# Patient Record
Sex: FEMALE | Race: WHITE | Employment: FULL TIME | URBAN - METROPOLITAN AREA
[De-identification: names, ages, dates, MRNs, and addresses within clinical notes are randomized per-mention and may not be internally consistent; named-entity substitution may affect disease eponyms.]

---

## 2024-04-12 LAB
C. TRACHOMATIS, EXTERNAL RESULT: NEGATIVE
N. GONORRHOEAE, EXTERNAL RESULT: NEGATIVE

## 2024-04-26 LAB
ABO, EXTERNAL RESULT: NORMAL
HEP B, EXTERNAL RESULT: NEGATIVE
HEPATITIS C ANTIBODY, EXTERNAL RESULT: NEGATIVE
HIV, EXTERNAL RESULT: NEGATIVE
RH FACTOR, EXTERNAL RESULT: POSITIVE
RPR, EXTERNAL RESULT: NON REACTIVE
RUBELLA TITER, EXTERNAL RESULT: NORMAL

## 2024-07-11 NOTE — PROGRESS NOTES
CARDIOLOGY CONSULTATION        Patient Name: Stephanie E Runner  Primary Care physician: Rosetta Alcantar MD    Reason for Referral/Chief Complaint: Stephanie E Runner is a 34 y.o. patient who is referred to cardiology clinic today for evaluation and treatment of palpitations.     History of Present Illness:   Stephanie E Runner is a very pleasant 34 y.o. female with PMHX  with anemia, depression treated with SSRI, childhood murmur. She presented to ED with palpitations, at that time she reported history of bradycardia during her previous pregnancy. EKG NSR 83bpm. She previously saw Dr. Pierre, 2021.   Prior cardiac testing include echo 3/2021 EF of 55%, mild MR, mild to moderate TR. she presented to the ED in June for chest pain, shortness of breath, tachycardia- workup unremarkable.    Today, she is experiencing intermittent elevated heart rate Reports her normal heart rate is in the 60's. Currently, she is pregnant with her third child and having heart rates in the 120-140's. She is ~22 weeks pregnant. The elevated heart rate happens every couple days, not every day. She will have symptoms of chest pain in the middle of chest, she could feel herself \"vibrating\", heart racing and had lightheadedness. Describes the chest pain as shooting pain in the middle, this incident happened right after eating. Lasted for 30 mins. She reports when she lays flat she does experiencing shortness of breath.     The patient denies shortness of breath at rest and dyspnea with exertion. Denies near-syncope or alexander syncope. Denies paroxysmal nocturnal dyspnea, orthopnea, bendopnea, increasing lower extremity edema or weight gain.      Denies tobacco use. Denies alcohol use. She does have her medical card for anxiety.   Past Medical History:   has a past medical history of Anemia, Depression, and Genital herpes.    Surgical History:   has no past surgical history on file.     Social History:   reports that she has never smoked.

## 2024-07-12 ENCOUNTER — TELEPHONE (OUTPATIENT)
Dept: CARDIOLOGY CLINIC | Age: 34
End: 2024-07-12

## 2024-07-12 ENCOUNTER — OFFICE VISIT (OUTPATIENT)
Dept: CARDIOLOGY CLINIC | Age: 34
End: 2024-07-12
Payer: COMMERCIAL

## 2024-07-12 VITALS
WEIGHT: 138.5 LBS | DIASTOLIC BLOOD PRESSURE: 58 MMHG | SYSTOLIC BLOOD PRESSURE: 92 MMHG | BODY MASS INDEX: 23.64 KG/M2 | HEIGHT: 64 IN | HEART RATE: 89 BPM | OXYGEN SATURATION: 99 %

## 2024-07-12 DIAGNOSIS — R06.02 SHORTNESS OF BREATH: ICD-10-CM

## 2024-07-12 DIAGNOSIS — R07.9 CHEST PAIN, UNSPECIFIED TYPE: ICD-10-CM

## 2024-07-12 DIAGNOSIS — Z79.899 MEDICATION MANAGEMENT: ICD-10-CM

## 2024-07-12 DIAGNOSIS — R42 DIZZINESS: Primary | ICD-10-CM

## 2024-07-12 PROCEDURE — 99204 OFFICE O/P NEW MOD 45 MIN: CPT | Performed by: STUDENT IN AN ORGANIZED HEALTH CARE EDUCATION/TRAINING PROGRAM

## 2024-07-12 PROCEDURE — 93270 REMOTE 30 DAY ECG REV/REPORT: CPT | Performed by: INTERNAL MEDICINE

## 2024-07-12 NOTE — PATIENT INSTRUCTIONS
Increase your water intake, try to take 90oz of water in a day.   Cardiac event monitor   Obtain echo   Please obtain the following labs;  TSH    Your provider has ordered testing for further evaluation.  An order/prescription has been included in your paper work.   To schedule outpatient testing, contact Central Scheduling by calling YOLANDE ([phone]).

## 2024-07-12 NOTE — TELEPHONE ENCOUNTER
Monitor placed by SCOTTIE Douglass  Monitor company BS  Length of monitor 7 days  Monitor ordered by AMP  Monitor ID [id number]  Activation successful prior to pt leaving office? Yes

## 2024-07-18 DIAGNOSIS — Z79.899 MEDICATION MANAGEMENT: ICD-10-CM

## 2024-07-19 LAB — TSH SERPL DL<=0.005 MIU/L-ACNC: 1.75 UIU/ML (ref 0.27–4.2)

## 2024-07-30 ENCOUNTER — ANCILLARY PROCEDURE (OUTPATIENT)
Dept: CARDIOLOGY CLINIC | Age: 34
End: 2024-07-30
Payer: COMMERCIAL

## 2024-07-30 DIAGNOSIS — R42 DIZZINESS: ICD-10-CM

## 2024-07-31 PROCEDURE — 93272 ECG/REVIEW INTERPRET ONLY: CPT | Performed by: INTERNAL MEDICINE

## 2024-08-23 ENCOUNTER — TELEPHONE (OUTPATIENT)
Dept: CARDIOLOGY CLINIC | Age: 34
End: 2024-08-23

## 2024-08-23 NOTE — TELEPHONE ENCOUNTER
Pt has been approved for Echo (TTE) complete (PRN contrast/bubble/strain/3D) [facility] From 8/22[facility]24  Approval # [id number]

## 2024-10-10 NOTE — PROGRESS NOTES
CARDIOLOGY OFFICE VISIT         Patient Name: Laura Gandara  Primary Care physician: Rosetta Alcantar MD    Reason for Referral/Chief Complaint: Laura Gandara is a 34 y.o. patient who presents today for a cardiology follow up.     History of Present Illness:   Laura Gandara is a very pleasant 34 y.o. female with PMHX  with anemia, depression treated with SSRI, childhood murmur. She presented to ED with palpitations, at that time she reported history of bradycardia during her previous pregnancy. EKG NSR 83bpm. She previously saw Dr. Pierre, 2021. Prior cardiac testing include echo 3/2021 EF of 55%, mild MR, mild to moderate TR. she presented to the ED 6/20/24 for chest pain, shortness of breath, tachycardia- workup unremarkable.  OV 7/12/24 she reported she is experiencing intermittent elevated heart rate, reported her normal heart rate is in the 60's. Currently, she is pregnant with her third child and having heart rates in the 120-140's. She is ~22 weeks pregnant. The elevated heart rate happens every couple days, not every day. She will have symptoms of chest pain in the middle of chest, she could feel herself \"vibrating\", heart racing and had lightheadedness. Described the chest pain as shooting pain in the middle, this incident happened right after eating. Lasted for 30 mins. She reports when she lays flat she does experiencing shortness of breath.    In the interim, echo 10/1/24 EF 55-60%.  Trace MR/TR.  Very trivial pericardial effusion.    Today, she is due 11/17. She report shortness of breath with laying down. She reports some swelling in her ankles, she just purchased compression stockings. 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  Patient states since last visit with lifestyle modifications including drinking more water she has had less palpitations/lightheadedness.  The patient denies chest pain, shortness of breath at rest or  with exertion. Denies near-syncope or alexander syncope.

## 2024-10-18 ENCOUNTER — OFFICE VISIT (OUTPATIENT)
Dept: CARDIOLOGY CLINIC | Age: 34
End: 2024-10-18
Payer: COMMERCIAL

## 2024-10-18 VITALS
BODY MASS INDEX: 26.49 KG/M2 | HEIGHT: 65 IN | OXYGEN SATURATION: 97 % | DIASTOLIC BLOOD PRESSURE: 56 MMHG | SYSTOLIC BLOOD PRESSURE: 112 MMHG | WEIGHT: 159 LBS | HEART RATE: 93 BPM

## 2024-10-18 DIAGNOSIS — R42 LIGHTHEADEDNESS: ICD-10-CM

## 2024-10-18 DIAGNOSIS — Z3A.36 36 WEEKS GESTATION OF PREGNANCY: ICD-10-CM

## 2024-10-18 DIAGNOSIS — R00.2 PALPITATIONS: Primary | ICD-10-CM

## 2024-10-18 PROBLEM — I50.9 HEART FAILURE (HCC): Status: RESOLVED | Noted: 2021-03-10 | Resolved: 2024-10-18

## 2024-10-18 PROCEDURE — 99212 OFFICE O/P EST SF 10 MIN: CPT | Performed by: STUDENT IN AN ORGANIZED HEALTH CARE EDUCATION/TRAINING PROGRAM

## 2024-10-22 LAB — GBS, EXTERNAL RESULT: NEGATIVE

## 2024-10-29 ENCOUNTER — HOSPITAL ENCOUNTER (EMERGENCY)
Age: 34
Discharge: HOME | End: 2024-10-29
Attending: EMERGENCY MEDICINE
Payer: COMMERCIAL

## 2024-10-29 VITALS
OXYGEN SATURATION: 99 % | DIASTOLIC BLOOD PRESSURE: 61 MMHG | HEART RATE: 89 BPM | RESPIRATION RATE: 14 BRPM | TEMPERATURE: 98.8 F | SYSTOLIC BLOOD PRESSURE: 118 MMHG

## 2024-10-29 DIAGNOSIS — M79.604 RIGHT LEG PAIN: Primary | ICD-10-CM

## 2024-10-29 PROCEDURE — 99283 EMERGENCY DEPT VISIT LOW MDM: CPT

## 2024-10-29 ASSESSMENT — PAIN SCALES - GENERAL
PAINLEVEL_OUTOF10: 3
PAINLEVEL_OUTOF10: 4

## 2024-10-29 ASSESSMENT — PAIN - FUNCTIONAL ASSESSMENT
PAIN_FUNCTIONAL_ASSESSMENT: 0-10
PAIN_FUNCTIONAL_ASSESSMENT: 0-10

## 2024-10-29 NOTE — ED NOTES
Paged Ralph Faust @[phone], per Dr Cardoza  RE: Rosie faust, 37wks pregnant, RLE swelling  Paged @[phone]  [facility] [place] returned page @[phone]

## 2024-10-29 NOTE — ED PROVIDER NOTES
never used smokeless tobacco. She reports that she does not currently use drugs. She reports that she does not drink alcohol.    Family history:    Family History   Problem Relation Age of Onset    Allergy (Severe) Mother     Depression Mother     Depression Brother     Depression Maternal Grandmother              Exam  ED Triage Vitals   BP Systolic BP Percentile Diastolic BP Percentile Temp Temp src Pulse Resp SpO2   -- -- -- -- -- -- -- --      Height Weight         -- --             Physical Exam  Vitals and nursing note reviewed.   Constitutional:       General: She is not in acute distress.     Appearance: She is well-developed.   HENT:      Head: Normocephalic and atraumatic.      Nose: Nose normal. No congestion.   Eyes:      General: No scleral icterus.     Extraocular Movements: Extraocular movements intact.   Cardiovascular:      Rate and Rhythm: Normal rate and regular rhythm.      Heart sounds: No murmur heard.  Pulmonary:      Effort: Pulmonary effort is normal.      Breath sounds: Normal breath sounds.   Abdominal:      General: There is no distension.      Palpations: Abdomen is soft.      Tenderness: There is no abdominal tenderness.   Musculoskeletal:         General: No deformity. Normal range of motion.      Cervical back: Normal range of motion and neck supple.      Right lower leg: No edema.      Left lower leg: No edema.      Comments: Trace pedal edema bilaterally otherwise no unilateral swelling or tenderness.  There is a small bump on the anterior tibial spine/shin and just lateral to it over the extensor shin muscles.  It is not tender there is no fluctuance or crepitus.  Neurovascular intact distally.  No swelling of the calf tenderness to the calf.   Skin:     General: Skin is warm.      Findings: No rash.   Neurological:      Mental Status: She is alert and oriented to person, place, and time.      Motor: No abnormal muscle tone.      Coordination: Coordination normal.   Psychiatric:

## 2024-11-05 ENCOUNTER — HOSPITAL ENCOUNTER (OUTPATIENT)
Dept: VASCULAR LAB | Age: 34
Discharge: HOME | End: 2024-11-07
Attending: EMERGENCY MEDICINE
Payer: COMMERCIAL

## 2024-11-05 DIAGNOSIS — M79.604 RIGHT LEG PAIN: ICD-10-CM

## 2024-11-05 PROCEDURE — 93971 EXTREMITY STUDY: CPT

## 2024-11-17 ENCOUNTER — ANESTHESIA (OUTPATIENT)
Dept: LABOR AND DELIVERY | Age: 34
End: 2024-11-17
Payer: COMMERCIAL

## 2024-11-17 ENCOUNTER — HOSPITAL ENCOUNTER (INPATIENT)
Age: 34
LOS: 1 days | Discharge: HOME | End: 2024-11-18
Attending: OBSTETRICS & GYNECOLOGY | Admitting: OBSTETRICS & GYNECOLOGY
Payer: COMMERCIAL

## 2024-11-17 ENCOUNTER — ANESTHESIA EVENT (OUTPATIENT)
Dept: LABOR AND DELIVERY | Age: 34
End: 2024-11-17
Payer: COMMERCIAL

## 2024-11-17 LAB
ABO + RH BLD: NORMAL
ABO + RH BLD: NORMAL
AMPHETAMINES UR QL SCN>1000 NG/ML: NORMAL
BARBITURATES UR QL SCN>200 NG/ML: NORMAL
BASOPHILS # BLD: 0 K/UL (ref 0–0.2)
BASOPHILS NFR BLD: 0.5 %
BENZODIAZ UR QL SCN>200 NG/ML: NORMAL
BLD GP AB SCN SERPL QL: NORMAL
BUPRENORPHINE+NOR UR QL SCN: NORMAL
CANNABINOIDS UR QL SCN>50 NG/ML: NORMAL
COCAINE UR QL SCN: NORMAL
DEPRECATED RDW RBC AUTO: 15.6 % (ref 12.4–15.4)
DRUG SCREEN COMMENT UR-IMP: NORMAL
EOSINOPHIL # BLD: 0.1 K/UL (ref 0–0.6)
EOSINOPHIL NFR BLD: 1.3 %
FENTANYL SCREEN, URINE: NORMAL
HCT VFR BLD AUTO: 32.9 % (ref 36–48)
HGB BLD-MCNC: 10.9 G/DL (ref 12–16)
LYMPHOCYTES # BLD: 1 K/UL (ref 1–5.1)
LYMPHOCYTES NFR BLD: 11.5 %
MCH RBC QN AUTO: 27.3 PG (ref 26–34)
MCHC RBC AUTO-ENTMCNC: 33 G/DL (ref 31–36)
MCV RBC AUTO: 82.8 FL (ref 80–100)
METHADONE UR QL SCN>300 NG/ML: NORMAL
MONOCYTES # BLD: 0.7 K/UL (ref 0–1.3)
MONOCYTES NFR BLD: 8.4 %
NEUTROPHILS # BLD: 6.9 K/UL (ref 1.7–7.7)
NEUTROPHILS NFR BLD: 78.3 %
OPIATES UR QL SCN>300 NG/ML: NORMAL
OXYCODONE UR QL SCN: NORMAL
PCP UR QL SCN>25 NG/ML: NORMAL
PH UR STRIP: 7 [PH]
PLATELET # BLD AUTO: 203 K/UL (ref 135–450)
PMV BLD AUTO: 8.8 FL (ref 5–10.5)
RBC # BLD AUTO: 3.98 M/UL (ref 4–5.2)
WBC # BLD AUTO: 8.8 K/UL (ref 4–11)

## 2024-11-17 PROCEDURE — 2500000003 HC RX 250 WO HCPCS: Performed by: NURSE ANESTHETIST, CERTIFIED REGISTERED

## 2024-11-17 PROCEDURE — 0HQ9XZZ REPAIR PERINEUM SKIN, EXTERNAL APPROACH: ICD-10-PCS | Performed by: OBSTETRICS & GYNECOLOGY

## 2024-11-17 PROCEDURE — 86780 TREPONEMA PALLIDUM: CPT

## 2024-11-17 PROCEDURE — 86900 BLOOD TYPING SEROLOGIC ABO: CPT

## 2024-11-17 PROCEDURE — 6360000002 HC RX W HCPCS: Performed by: NURSE ANESTHETIST, CERTIFIED REGISTERED

## 2024-11-17 PROCEDURE — 86850 RBC ANTIBODY SCREEN: CPT

## 2024-11-17 PROCEDURE — 6370000000 HC RX 637 (ALT 250 FOR IP): Performed by: ADVANCED PRACTICE MIDWIFE

## 2024-11-17 PROCEDURE — 1220000000 HC SEMI PRIVATE OB R&B

## 2024-11-17 PROCEDURE — 7200000001 HC VAGINAL DELIVERY

## 2024-11-17 PROCEDURE — 2580000003 HC RX 258: Performed by: ADVANCED PRACTICE MIDWIFE

## 2024-11-17 PROCEDURE — 85025 COMPLETE CBC W/AUTO DIFF WBC: CPT

## 2024-11-17 PROCEDURE — 6360000002 HC RX W HCPCS: Performed by: ADVANCED PRACTICE MIDWIFE

## 2024-11-17 PROCEDURE — 80307 DRUG TEST PRSMV CHEM ANLYZR: CPT

## 2024-11-17 PROCEDURE — 51701 INSERT BLADDER CATHETER: CPT

## 2024-11-17 PROCEDURE — 86901 BLOOD TYPING SEROLOGIC RH(D): CPT

## 2024-11-17 RX ORDER — SODIUM CHLORIDE 0.9 % (FLUSH) 0.9 %
5-40 SYRINGE (ML) INJECTION EVERY 12 HOURS SCHEDULED
Status: DISCONTINUED | OUTPATIENT
Start: 2024-11-17 | End: 2024-11-18 | Stop reason: HOSPADM

## 2024-11-17 RX ORDER — MISOPROSTOL 100 UG/1
400 TABLET ORAL PRN
Status: DISCONTINUED | OUTPATIENT
Start: 2024-11-17 | End: 2024-11-18 | Stop reason: HOSPADM

## 2024-11-17 RX ORDER — SODIUM CHLORIDE 0.9 % (FLUSH) 0.9 %
5-40 SYRINGE (ML) INJECTION PRN
Status: DISCONTINUED | OUTPATIENT
Start: 2024-11-17 | End: 2024-11-18 | Stop reason: HOSPADM

## 2024-11-17 RX ORDER — FENTANYL CITRATE 50 UG/ML
INJECTION, SOLUTION INTRAMUSCULAR; INTRAVENOUS
Status: COMPLETED
Start: 2024-11-17 | End: 2024-11-17

## 2024-11-17 RX ORDER — METHYLERGONOVINE MALEATE 0.2 MG/ML
200 INJECTION INTRAVENOUS PRN
Status: DISCONTINUED | OUTPATIENT
Start: 2024-11-17 | End: 2024-11-18 | Stop reason: HOSPADM

## 2024-11-17 RX ORDER — TERBUTALINE SULFATE 1 MG/ML
0.25 INJECTION, SOLUTION SUBCUTANEOUS
Status: ACTIVE | OUTPATIENT
Start: 2024-11-17 | End: 2024-11-18

## 2024-11-17 RX ORDER — FENTANYL CITRATE 50 UG/ML
INJECTION, SOLUTION INTRAMUSCULAR; INTRAVENOUS
Status: DISCONTINUED | OUTPATIENT
Start: 2024-11-17 | End: 2024-11-17 | Stop reason: SDUPTHER

## 2024-11-17 RX ORDER — KETOROLAC TROMETHAMINE 30 MG/ML
30 INJECTION, SOLUTION INTRAMUSCULAR; INTRAVENOUS EVERY 6 HOURS PRN
Status: DISCONTINUED | OUTPATIENT
Start: 2024-11-17 | End: 2024-11-18 | Stop reason: HOSPADM

## 2024-11-17 RX ORDER — CARBOPROST TROMETHAMINE 250 UG/ML
250 INJECTION, SOLUTION INTRAMUSCULAR PRN
Status: DISCONTINUED | OUTPATIENT
Start: 2024-11-17 | End: 2024-11-18 | Stop reason: HOSPADM

## 2024-11-17 RX ORDER — DOCUSATE SODIUM 100 MG/1
100 CAPSULE, LIQUID FILLED ORAL 2 TIMES DAILY
Status: DISCONTINUED | OUTPATIENT
Start: 2024-11-17 | End: 2024-11-18 | Stop reason: HOSPADM

## 2024-11-17 RX ORDER — ACETAMINOPHEN 500 MG
1000 TABLET ORAL EVERY 8 HOURS PRN
Status: DISCONTINUED | OUTPATIENT
Start: 2024-11-17 | End: 2024-11-18 | Stop reason: HOSPADM

## 2024-11-17 RX ORDER — ONDANSETRON 4 MG/1
4 TABLET, ORALLY DISINTEGRATING ORAL EVERY 6 HOURS PRN
Status: DISCONTINUED | OUTPATIENT
Start: 2024-11-17 | End: 2024-11-18 | Stop reason: HOSPADM

## 2024-11-17 RX ORDER — ONDANSETRON 2 MG/ML
4 INJECTION INTRAMUSCULAR; INTRAVENOUS EVERY 6 HOURS PRN
Status: DISCONTINUED | OUTPATIENT
Start: 2024-11-17 | End: 2024-11-18 | Stop reason: HOSPADM

## 2024-11-17 RX ORDER — BENZOCAINE/MENTHOL 6 MG-10 MG
LOZENGE MUCOUS MEMBRANE
Status: DISCONTINUED | OUTPATIENT
Start: 2024-11-17 | End: 2024-11-18 | Stop reason: HOSPADM

## 2024-11-17 RX ORDER — SODIUM CHLORIDE, SODIUM LACTATE, POTASSIUM CHLORIDE, AND CALCIUM CHLORIDE .6; .31; .03; .02 G/100ML; G/100ML; G/100ML; G/100ML
500 INJECTION, SOLUTION INTRAVENOUS PRN
Status: DISCONTINUED | OUTPATIENT
Start: 2024-11-17 | End: 2024-11-18 | Stop reason: HOSPADM

## 2024-11-17 RX ORDER — LIDOCAINE HYDROCHLORIDE AND EPINEPHRINE BITARTRATE 20; .01 MG/ML; MG/ML
INJECTION, SOLUTION SUBCUTANEOUS
Status: DISCONTINUED | OUTPATIENT
Start: 2024-11-17 | End: 2024-11-17 | Stop reason: SDUPTHER

## 2024-11-17 RX ORDER — SODIUM CHLORIDE 9 MG/ML
25 INJECTION, SOLUTION INTRAVENOUS PRN
Status: DISCONTINUED | OUTPATIENT
Start: 2024-11-17 | End: 2024-11-18 | Stop reason: HOSPADM

## 2024-11-17 RX ORDER — ACETAMINOPHEN 325 MG/1
650 TABLET ORAL EVERY 4 HOURS PRN
Status: DISCONTINUED | OUTPATIENT
Start: 2024-11-17 | End: 2024-11-17

## 2024-11-17 RX ORDER — TRANEXAMIC ACID 10 MG/ML
1000 INJECTION, SOLUTION INTRAVENOUS
Status: ACTIVE | OUTPATIENT
Start: 2024-11-17 | End: 2024-11-18

## 2024-11-17 RX ORDER — SODIUM CHLORIDE 9 MG/ML
INJECTION, SOLUTION INTRAVENOUS PRN
Status: DISCONTINUED | OUTPATIENT
Start: 2024-11-17 | End: 2024-11-18 | Stop reason: HOSPADM

## 2024-11-17 RX ORDER — IBUPROFEN 800 MG/1
800 TABLET, FILM COATED ORAL EVERY 8 HOURS PRN
Status: DISCONTINUED | OUTPATIENT
Start: 2024-11-17 | End: 2024-11-18 | Stop reason: HOSPADM

## 2024-11-17 RX ORDER — BUPIVACAINE HYDROCHLORIDE 2.5 MG/ML
INJECTION, SOLUTION EPIDURAL; INFILTRATION; INTRACAUDAL
Status: DISCONTINUED | OUTPATIENT
Start: 2024-11-17 | End: 2024-11-17 | Stop reason: SDUPTHER

## 2024-11-17 RX ORDER — FLUOXETINE 20 MG/5ML
40 SOLUTION ORAL DAILY
COMMUNITY

## 2024-11-17 RX ORDER — SODIUM CHLORIDE, SODIUM LACTATE, POTASSIUM CHLORIDE, CALCIUM CHLORIDE 600; 310; 30; 20 MG/100ML; MG/100ML; MG/100ML; MG/100ML
INJECTION, SOLUTION INTRAVENOUS CONTINUOUS
Status: DISCONTINUED | OUTPATIENT
Start: 2024-11-17 | End: 2024-11-18 | Stop reason: HOSPADM

## 2024-11-17 RX ADMIN — SODIUM CHLORIDE, POTASSIUM CHLORIDE, SODIUM LACTATE AND CALCIUM CHLORIDE 500 ML: 600; 310; 30; 20 INJECTION, SOLUTION INTRAVENOUS at 06:15

## 2024-11-17 RX ADMIN — Medication 12 ML/HR: at 06:49

## 2024-11-17 RX ADMIN — Medication 1 MILLI-UNITS/MIN: at 11:42

## 2024-11-17 RX ADMIN — BUPIVACAINE HYDROCHLORIDE 5 ML: 2.5 INJECTION, SOLUTION EPIDURAL; INFILTRATION; INTRACAUDAL; PERINEURAL at 06:41

## 2024-11-17 RX ADMIN — FENTANYL CITRATE 25 MCG: 50 INJECTION, SOLUTION INTRAMUSCULAR; INTRAVENOUS at 06:38

## 2024-11-17 RX ADMIN — LIDOCAINE HYDROCHLORIDE AND EPINEPHRINE 3 ML: 20; 10 INJECTION, SOLUTION INFILTRATION; PERINEURAL at 06:38

## 2024-11-17 RX ADMIN — BUPIVACAINE HYDROCHLORIDE 5 ML: 2.5 INJECTION, SOLUTION EPIDURAL; INFILTRATION; INTRACAUDAL; PERINEURAL at 06:45

## 2024-11-17 RX ADMIN — SODIUM CHLORIDE, SODIUM LACTATE, POTASSIUM CHLORIDE, AND CALCIUM CHLORIDE: .6; .31; .03; .02 INJECTION, SOLUTION INTRAVENOUS at 06:40

## 2024-11-17 RX ADMIN — IBUPROFEN 800 MG: 800 TABLET, FILM COATED ORAL at 21:42

## 2024-11-17 RX ADMIN — DOCUSATE SODIUM 100 MG: 100 CAPSULE, LIQUID FILLED ORAL at 21:42

## 2024-11-17 RX ADMIN — FENTANYL CITRATE 75 MCG: 50 INJECTION, SOLUTION INTRAMUSCULAR; INTRAVENOUS at 06:45

## 2024-11-17 RX ADMIN — BUPIVACAINE HYDROCHLORIDE 10 ML: 2.5 INJECTION, SOLUTION EPIDURAL; INFILTRATION; INTRACAUDAL; PERINEURAL at 15:09

## 2024-11-17 NOTE — ANESTHESIA PRE PROCEDURE
Department of Anesthesiology  Preprocedure Note       Name:  Laura Gandara   Age:  34 y.o.  :  1990                                          MRN:  [id number]         Date:  2024      Surgeon: * No surgeons listed *    Procedure: * No procedures listed *    Medications prior to admission:   Prior to Admission medications    Medication Sig Start Date End Date Taking? Authorizing Provider   FLUoxetine (PROZAC) 20 MG/5ML solution Take 10 mLs by mouth daily   Yes Halima Eugene MD   MAGNESIUM PO Take by mouth   Yes Halima Eugene MD   Prenatal MV-Min-Fe Fum-FA-DHA (PRENATAL 1 PO) Take by mouth   Yes Halima Eugene MD   ferrous sulfate 325 (65 FE) MG tablet Take 1 tablet by mouth daily (with breakfast)   Yes Halima Eugene MD   acyclovir (ZOVIRAX) 400 MG tablet Take 1 tablet by mouth 3 times daily   Yes Halima Eugene MD   Polyethylene Glycol 3350 (MIRALAX PO) Take by mouth  Patient not taking: Reported on 2024    ProviderHalima MD       Current medications:    Current Facility-Administered Medications   Medication Dose Route Frequency Provider Last Rate Last Admin    terbutaline (BRETHINE) injection 0.25 mg  0.25 mg SubCUTAneous Once PRN Ciro Umanzoria, APRN - CNM        sodium chloride flush 0.9 % injection 5-40 mL  5-40 mL IntraVENous 2 times per day Genna Umanzor, APRN - CNM        sodium chloride flush 0.9 % injection 5-40 mL  5-40 mL IntraVENous PRN Maddie Umanzorricia, APRN - CNM        0.9 % sodium chloride infusion  25 mL IntraVENous PRN Ciro Umanzoria, APRN - CNM        acetaminophen (TYLENOL) tablet 650 mg  650 mg Oral Q4H PRN Maddie Umanzorricia, APRN - CNM        witch hazel-glycerin (TUCKS) pad   Topical PRN Ciro Umanzoria, APRN - CNM        phenylephrine-mineral oil-petrolatum (PREPARATION H) rectal ointment   Rectal Q2H PRN Erna, Genna, APRN - CNM        hydrocortisone 1 % cream   Topical Q2H PRN MolMaddie villedaricia, APRN - CNM

## 2024-11-17 NOTE — H&P
Department of Obstetrics and Gynecology   Obstetrics History and Physical        CHIEF COMPLAINT:  Admitted with complaint of onset of labor. Had irregular contractions throughout the day yesterday. Onset of regular painful contractions overnight. Denies LOF, bleeding. Baby has been active. Denies recent illness, fevers, HSV lesions/prodromal s/s.     HISTORY OF PRESENT ILLNESS:      The patient is a 34 y.o. female at 40w0d.  OB History          4    Para   2    Term   1            AB   1    Living   2         SAB        IAB   1    Ectopic        Molar        Multiple   1    Live Births   2            Patient presents with a chief complaint as above and is being admitted for active phase labor    Estimated Due Date: Estimated Date of Delivery: 24    PRENATAL CARE:    Complicated by: AMA-33yo, Maternit-21 WNL XX; Anemia in pregnancy, Had cardiology f/u related to SOB/tachycardia, normal Holter, no further f/u ; Hx HSV-prophylaxis at 36 weeks, Hx of depression,-restarted Prozac 40mg at 38 wks. , TWG=34#. GBS negative    PAST OB HISTORY:  OB History          4    Para   2    Term   1            AB   1    Living   2         SAB        IAB   1    Ectopic        Molar        Multiple   1    Live Births   2                Past Medical History:        Diagnosis Date    Anemia     Depression     on prozac prior to pregnancy    Genital herpes     took acyclovir, no outbreaks currently    Postpartum depression      Past Surgical History:    History reviewed. No pertinent surgical history.  Allergies:  [place]    Social History:    Social History     Socioeconomic History    Marital status: Single     Spouse name: Not on file    Number of children: Not on file    Years of education: Not on file    Highest education level: Not on file   Occupational History    Not on file   Tobacco Use    Smoking status: Never    Smokeless tobacco: Never   Vaping Use    Vaping status: Never Used

## 2024-11-17 NOTE — PROGRESS NOTES
Called to bedside for prolonged deceleration  Comfortable with epidural   Baseline 120 moderate variability. Prolonged deceleration to 90s x 5 minutes, recovered w/ position changes, IVF bolus  Contractions q 3-4  SROM noted with exam clear fluid cx 5-6/80/-1 soft anterior   Continue plan of care  Anticipate vaginal delivery

## 2024-11-17 NOTE — L&D DELIVERY SUMMARY NOTE
Department of Obstetrics and Gynecology  Spontaneous Vaginal Delivery Note    Labor & Delivery Summary  Dilation Complete Date: 24  Dilation Complete Time: 1530    Pre-operative Diagnosis:  Term pregnancy and Hx HSV w/ 36 wk prophylaxis, hx depression/anxiety on medication, AMA 35yo    Post-operative Diagnosis:  Living  infant(s) and Female    Procedure:  Spontaneous vaginal delivery or Repair first degree spontaneous laceration    Surgeon:   DASHA Umanzor CNM    Information for the patient's :  Itzel Gandara [[id number]]        Anesthesia:  epidural anesthesia    Estimated blood loss:  200 cc    Specimen:  Placenta not sent to pathology     Cord blood sent Yes    Complications:  none    Condition:  infant stable and skin to skin with the mother and mother stable    Details of Procedure:   The patient is a 34 y.o. female at 40w0d   OB History          4    Para   2    Term   1            AB   1    Living   2         SAB        IAB   1    Ectopic        Molar        Multiple   1    Live Births   2             who was admitted for active phase labor. She received the following interventions: IV Pitocin augmentation with max pitocin at 3 miu. She was known to be GBS negative and did not receive antibiotic prophylaxis. The patient progressed well,did receive an epidural, became complete and started to push. After pushing for 2 contractions the fetal head was at the perineum and the rest of the infant delivered atraumatically and was placed on the mother's abdomen.Cord was clamped and cut after delay, cord blood was obtained and infant was placed skin to skin with the mother. The delivery of the placenta was spontaneous. The perineum and vagina were explored and a first degree laceration was repaired in standard fashion. This mother plans to bottle feed her baby. Dr Motta will be notified of this birth.

## 2024-11-17 NOTE — PROGRESS NOTES
Comfortable with epidural  Afebrile,VSS  FHT Category 1  Contractions q 4-5 minutes  Cx 6/80/-1 leaking clear fluid  Plan to start pitocin augmentation  Anticipate vaginal delivery

## 2024-11-17 NOTE — PROGRESS NOTES
Comfortable with epidural  Afebrile,VSS  FHT Category 1  Contractions q 3-5 minutes  Cx 8/80/0 AROM of forebag  Continue pitocin titration  Anticipate vaginal delivery

## 2024-11-17 NOTE — ANESTHESIA PROCEDURE NOTES
CSE Block    Patient location during procedure: OB  Start time: 11/17/2024 6:28 AM  End time: 11/17/2024 6:45 AM  Reason for block: labor epidural  Staffing  Resident/CRNA: Shaquille Elizondo APRN - CRNA  Performed by: Shaquille Elizondo APRN - CRNA  Authorized by: Acosta Lloyd MD    CSE  Patient position: sitting  Prep: ChloraPrep  Patient monitoring: cardiac monitor, continuous pulse ox and frequent blood pressure checks  Approach: midline  Provider prep: mask and sterile gloves  Spinal Needle  Needle type: pencil-tip   Needle gauge: 25 G  Needle length: 3.5 in  Epidural Needle  Injection technique: GRIS saline  Needle type: Tuohy   Needle gauge: 18 G  Needle length: 3.5 in  Needle insertion depth: 7 cm  Location: lumbar (1-5)  Catheter  Catheter type: end hole  Catheter size: 18 G  Catheter at skin depth: 14 cm  Test dose: negative  [id number]  Hemodynamics: stable  Preanesthetic Checklist  Completed: patient identified, IV checked, site marked, risks and benefits discussed, surgical/procedural consents, equipment checked, pre-op evaluation, timeout performed, anesthesia consent given, oxygen available, monitors applied/VS acknowledged, fire risk safety assessment completed and verbalized and blood product R/B/A discussed and consented          
There are no Wet Read(s) to document.

## 2024-11-18 VITALS
OXYGEN SATURATION: 95 % | SYSTOLIC BLOOD PRESSURE: 113 MMHG | TEMPERATURE: 99.1 F | BODY MASS INDEX: 27.31 KG/M2 | RESPIRATION RATE: 16 BRPM | DIASTOLIC BLOOD PRESSURE: 72 MMHG | WEIGHT: 160 LBS | HEIGHT: 64 IN | HEART RATE: 96 BPM

## 2024-11-18 LAB
DEPRECATED RDW RBC AUTO: 15.6 % (ref 12.4–15.4)
HCT VFR BLD AUTO: 29.1 % (ref 36–48)
HGB BLD-MCNC: 9.5 G/DL (ref 12–16)
MCH RBC QN AUTO: 27.3 PG (ref 26–34)
MCHC RBC AUTO-ENTMCNC: 32.6 G/DL (ref 31–36)
MCV RBC AUTO: 83.8 FL (ref 80–100)
PLATELET # BLD AUTO: 160 K/UL (ref 135–450)
PMV BLD AUTO: 8.6 FL (ref 5–10.5)
RBC # BLD AUTO: 3.47 M/UL (ref 4–5.2)
REAGIN+T PALLIDUM IGG+IGM SERPL-IMP: NORMAL
WBC # BLD AUTO: 7.9 K/UL (ref 4–11)

## 2024-11-18 PROCEDURE — 6370000000 HC RX 637 (ALT 250 FOR IP): Performed by: ADVANCED PRACTICE MIDWIFE

## 2024-11-18 PROCEDURE — 36415 COLL VENOUS BLD VENIPUNCTURE: CPT

## 2024-11-18 PROCEDURE — 85027 COMPLETE CBC AUTOMATED: CPT

## 2024-11-18 RX ORDER — IBUPROFEN 800 MG/1
800 TABLET, FILM COATED ORAL EVERY 8 HOURS PRN
Qty: 20 TABLET | Refills: 0 | Status: SHIPPED | OUTPATIENT
Start: 2024-11-18

## 2024-11-18 RX ORDER — ACETAMINOPHEN 500 MG
1000 TABLET ORAL EVERY 8 HOURS PRN
COMMUNITY
Start: 2024-11-18

## 2024-11-18 RX ADMIN — IBUPROFEN 800 MG: 800 TABLET, FILM COATED ORAL at 06:51

## 2024-11-18 RX ADMIN — ACETAMINOPHEN 1000 MG: 500 TABLET ORAL at 07:48

## 2024-11-18 RX ADMIN — IBUPROFEN 800 MG: 800 TABLET, FILM COATED ORAL at 15:05

## 2024-11-18 RX ADMIN — DOCUSATE SODIUM 100 MG: 100 CAPSULE, LIQUID FILLED ORAL at 07:48

## 2024-11-18 ASSESSMENT — PAIN DESCRIPTION - LOCATION
LOCATION: ABDOMEN

## 2024-11-18 ASSESSMENT — PAIN SCALES - GENERAL
PAINLEVEL_OUTOF10: 6

## 2024-11-18 ASSESSMENT — PAIN DESCRIPTION - DESCRIPTORS
DESCRIPTORS: CRAMPING
DESCRIPTORS: CRAMPING
DESCRIPTORS: DULL;CRAMPING

## 2024-11-18 ASSESSMENT — PAIN - FUNCTIONAL ASSESSMENT
PAIN_FUNCTIONAL_ASSESSMENT: ACTIVITIES ARE NOT PREVENTED
PAIN_FUNCTIONAL_ASSESSMENT: ACTIVITIES ARE NOT PREVENTED

## 2024-11-18 NOTE — FLOWSHEET NOTE
Discharge Phone Call    Patient Name: Laura Gandara     OB Care Provider: Laura Motta MD Discharge Date: 2024    Disposition of baby:    Phone Number: [phone] (home)     Attempts to Contact:  Date:    Caller  Date:    Caller  Date:    Caller    Information for the patient's :  Itzel Gandara [[id number]]   Delivery Method: Vaginal, Spontaneous    1.  Now that you are at home is your pain being well controlled?   Y/N   If no, instruct to call       provider.      2. Are you breastfeeding?    Y/N    Do you need any extra support from our lactation staff?      Y/N    If yes, provide number for lactation.  3. Have you made or already had your first appointment with the baby's doctor? Y/N   If no, do      you know when to schedule it?  Y/N    4. Have you scheduled your follow-up appointment?  Y/N  If no, do you know when to schedule       it?    Y/N   If no, they can find it on printed discharge instructions.  5. Did staff discuss safe sleep during your stay? Y/N   6. Did we explain things in a way you could understand?  Y/N  7. Were we respectful of your preferences for labor and birth and include you in the plan of       care?  Y/N  If no, please explain _______________________________________________  8. Is there anyone in particular you would like to mention who provided care for you? _______      _________________________________________________________________________     9. Were you given a Post-Birth Warning Signs handout?  Y/N  Do you have it somewhere      easily accessible?  Y/N  If no, please send them a copy and ask them to put it somewhere      easily found.  10. Have you been crying excessively, having anger or mood swings that feel out of control, or       feel like you can't cope with caring for yourself or baby? Y/N   If yes, they may be showing       signs of postpartum depression and should call provider. There is also a        depression test

## 2024-11-18 NOTE — PLAN OF CARE
Problem: Vaginal Birth or  Section  Goal: Fetal and maternal status remain reassuring during the birth process  Description:  Birth OB-Pregnancy care plan goal which identifies if the fetal and maternal status remain reassuring during the birth process  Outcome: Progressing     Problem: Postpartum  Goal: Experiences normal postpartum course  Description:  Postpartum OB-Pregnancy care plan goal which identifies if the mother is experiencing a normal postpartum course  Outcome: Progressing  Goal: Appropriate maternal -  bonding  Description:  Postpartum OB-Pregnancy care plan goal which identifies if the mother and  are bonding appropriately  Outcome: Progressing  Goal: Establishment of infant feeding pattern  Description:  Postpartum OB-Pregnancy care plan goal which identifies if the mother is establishing a feeding pattern with their   Outcome: Progressing  Goal: Incisions, wounds, or drain sites healing without S/S of infection  Outcome: Progressing  Flowsheets (Taken 2024 by Xochitl Silverio)  Incisions, Wounds, or Drain Sites Healing Without Sign and Symptoms of Infection:   ADMISSION and DAILY: Assess and document risk factors for pressure ulcer development   TWICE DAILY: Assess and document skin integrity     Problem: Pain  Goal: Verbalizes/displays adequate comfort level or baseline comfort level  Outcome: Progressing     Problem: Infection - Adult  Goal: Absence of infection at discharge  Outcome: Progressing  Goal: Absence of infection during hospitalization  Outcome: Progressing  Goal: Absence of fever/infection during anticipated neutropenic period  Outcome: Progressing     Problem: Safety - Adult  Goal: Free from fall injury  Outcome: Progressing     Problem: Discharge Planning  Goal: Discharge to home or other facility with appropriate resources  Outcome: Progressing     Problem: Chronic Conditions and Co-morbidities  Goal: Patient's chronic conditions and

## 2024-11-18 NOTE — PROGRESS NOTES
Department of Obstetrics and Gynecology  Labor and Delivery  Attending Post Partum Progress Note      SUBJECTIVE:  Denies c/o. Tolerating a regular diet. Ambulating and voiding w/o difficulty. Bleeding WNL. Pain well controlled w/ Tylenol and ibuprofen. Breastfeeding infant. Desires d/c home today.       OBJECTIVE:      Vitals:  /70   Pulse 78   Temp 98.2 °F (36.8 °C)   Resp 16   Ht 1.626 m (5' 4\")   Wt 72.6 kg (160 lb)   SpO2 96%   Breastfeeding Unknown   BMI 27.46 kg/m²     ABDOMEN:  FF, midline, non-tender  GENITAL/URINARY:  intact    DATA:    CBC:    Lab Results   Component Value Date/Time    WBC 7.9 2024 06:51 AM    RBC 3.47 2024 06:51 AM    HGB 9.5 2024 06:51 AM    HCT 29.1 2024 06:51 AM    MCV 83.8 2024 06:51 AM    RDW 15.6 2024 06:51 AM     2024 06:51 AM       ASSESSMENT & PLAN:      PPD 1 s/p   Anemia secondary to iron deficiency and acute blood loss - start FeSO4  Meeting PP milestones  Stable, female bottle feeding infant  D/c home today pending infant d/c  Reviewed PP warning signs and when to call  Rx for ibuprofen, OTC tylenol, FeSO4 and colace  RTO 6 wks or PRN

## 2024-11-18 NOTE — DISCHARGE INSTRUCTIONS
Thank you for the opportunity to care for you and your family.    We hope that you are happy with the care we provided during your stay in the [facility]. We want to ensure that you have the help you need when you leave the hospital. If there is anything we can assist you with, please let us know.    Breastfeeding mothers may contact our lactation specialists with any problems or questions.  [facility] lactation services phone number is [phone].  Leave a message and your call will be returned.    Please refer to the information provided in the postpartum care booklet.  The following are warning signs to remember.    Call 911 if you have:    Chest pain or pressure  Shortness of breath, even at rest  Thoughts of harming yourself or others  Seizures    Call your healthcare provider if you have:    Temperature of 100.4 degrees or higher  Stitches that are not healing        -- Swelling, bleeding, drainage, foul odor, redness or warmth in/around your           stitches, staples, or incision (scar)        -- Bad smelling blood or discharge from the vagina  Vaginal bleeding that has increased         -- Soaking through one pad in an hour        -- You are passing clots larger than the size of a lemon  Red, warm tender area(s) in your breast or calf  Headache that does not get better, even after taking medicine; or headache with vision changes    Remember to notify all healthcare providers from your date of delivery to up to one year after giving birth!    CARING FOR YOURSELF        DIET/ACTIVITY    Eat a well balanced diet focusing on foods high in fiber and protein.  Drink plenty of fluids, especially water.  To avoid constipation you may take a mild stool softener as recommended by your doctor or midwife.  Gradually increase your activity. Resume an exercise regime only after being advised by your doctor or midwife.  When sitting or lying down, keep your legs elevated to reduce swelling.  Avoid 
(0) Normal

## 2024-11-18 NOTE — FLOWSHEET NOTE
Discharge instructions reviewed with this patient and her family who voiced a good understanding of same.  Infant secured by mom/family in personal car seat and carried on moms lap who rode in a wheelchair to the exit.

## 2024-11-18 NOTE — DISCHARGE SUMMARY
Obstetrical Discharge Form    Gestational Age: 40w0d    Antepartum complications: AMA, anemia, hx HSV w/ prophylaxis at 36 wks. Had cardiology f/u related to SOB/tachycardia, normal Holter, no further f/u indicated. Hx of depression-restarted Prozac 40mg at 38 wks     Date of Delivery: 24      Type of Delivery: vaginal, spontaneous    Delivered By: BOO RENDON    Baby:      Information for the patient's :  Itzel Gandara [[id number]]   APGAR One: 8  Information for the patient's :  Itzel Gandara [[id number]]   APGAR Five: 9  Information for the patient's :  Itzel Gandara [[id number]]   Birth Weight: 3.523 kg (7 lb 12.3 oz)    Anesthesia: Epidural    Intrapartum complications: None    Postpartum complications: anemia    Discharge Medication:      Medication List        START taking these medications      acetaminophen 500 MG tablet  Commonly known as: TYLENOL  Take 2 tablets by mouth every 8 hours as needed for Pain     ibuprofen 800 MG tablet  Commonly known as: ADVIL;MOTRIN  Take 1 tablet by mouth every 8 hours as needed for Pain            CONTINUE taking these medications      ferrous sulfate 325 (65 Fe) MG tablet  Commonly known as: IRON 325     FLUoxetine 20 MG/5ML solution  Commonly known as: PROzac     PRENATAL 1 PO            STOP taking these medications      acyclovir 400 MG tablet  Commonly known as: ZOVIRAX     MAGNESIUM PO     MIRALAX PO               Where to Get Your Medications        These medications were sent to [facility] - [address] - P [phone] - F [phone]  [address] [place] [id number]      Phone: [phone]   ibuprofen 800 MG tablet       You can get these medications from any pharmacy    You don't need a prescription for these medications  acetaminophen 500 MG tablet         Discharge Condition:  good    Discharge Date: 24    PLAN:  Follow up in 6 weeks

## 2024-11-18 NOTE — L&D DELIVERY SUMMARY NOTE
[address] [address]                         LABOR AND DELIVERY NOTE      PATIENT NAME: DONNA NGUYEN        : 1990  MED REC NO: [id number]                      ROOM: 0383  ACCOUNT NO: [id number]                       ADMIT DATE: 2024  PROVIDER: Genna Umanzor CNM      DATE OF PROCEDURE: 2024    SURGEON:  Genna Umanzor CNM    The patient is a 34-year-old  4, para 2, at 40 weeks' gestation who was admitted on 2024, in active labor at term.  Her pregnancy course was notable for an AMA at 34 years old.  She had a normal free fetal DNA test with a female fetus.  She did have anemia during her pregnancy.  She had a cardiology followup related to tachycardia.  She had a normal Holter monitor, and no further followup indicated as per Cardiology.  She also has a history of herpes prior to pregnancy and was on 36-week prophylactic medication.  No lesions were noted on admission.  She has a history of depression and anxiety.  She did restart Prozac 40 mg approximately 2 weeks ago.  On admission, she was 5 cm dilated, 80% effaced, -2 station with a cephalic presentation.  Fetal heart tones were category 1.  She was uncomfortable on admission and requested and received an epidural with good relief.  Her contractions did space out after her epidural and she received Pitocin augmentation, max Pitocin was 3 milliunits.  Her GBS was negative and she did not receive antibiotics.  She did progress well.  Membranes ruptured spontaneously with clear fluid.  She became complete and pushed through 2 contractions and had a spontaneous vaginal delivery of a live born infant female.  Apgar scores were 8 and 9.  The infant established good color, tone, and cry at delivery.  Cord was doubly clamped and cut after delay and cord blood was obtained.  The infant was then placed skin to skin with the mother.  The

## 2024-11-18 NOTE — PROGRESS NOTES
Patient up for the first time with the help of 2 RNs. Assist x 2 provided; patient tolerated well. Pericare performed, panties applied and pad changed. Patient voided 450ml without difficulty. Patient ambulated to wheelchair and transferred to room 318  without difficulty. Patient is pink and stable.

## 2024-11-18 NOTE — ANESTHESIA POSTPROCEDURE EVALUATION
Department of Anesthesiology  Postprocedure Note    Patient: Laura Gandara  MRN: [id number]  YOB: 1990  Date of evaluation: 11/18/2024    Procedure Summary       Date: 11/17/24 Room / Location:     Anesthesia Start: 0628 Anesthesia Stop: 1546    Procedure: Labor Analgesia Diagnosis:     Scheduled Providers:  Responsible Provider: Acosta Lloyd MD    Anesthesia Type: general, spinal, epidural, CSE ASA Status: 2            Anesthesia Type: No value filed.    Jaycee Phase I: Jaycee Score: 9    Jaycee Phase II: Jaycee Score: 9    Anesthesia Post Evaluation    Patient location during evaluation: bedside  Patient participation: complete - patient participated  Level of consciousness: awake and alert  Nausea & Vomiting: no nausea and no vomiting  Cardiovascular status: hemodynamically stable  Hydration status: stable  Pain management: adequate and satisfactory to patient        No notable events documented.   Resume activities as tolerated.